# Patient Record
Sex: FEMALE | Employment: UNEMPLOYED | URBAN - NONMETROPOLITAN AREA
[De-identification: names, ages, dates, MRNs, and addresses within clinical notes are randomized per-mention and may not be internally consistent; named-entity substitution may affect disease eponyms.]

---

## 2024-05-20 ENCOUNTER — HOSPITAL ENCOUNTER (EMERGENCY)
Age: 34
Discharge: HOME | End: 2024-05-20
Payer: OTHER GOVERNMENT

## 2024-05-20 VITALS
BODY MASS INDEX: 24.34 KG/M2 | WEIGHT: 170 LBS | HEART RATE: 80 BPM | TEMPERATURE: 98.5 F | DIASTOLIC BLOOD PRESSURE: 92 MMHG | OXYGEN SATURATION: 100 % | HEIGHT: 70 IN | RESPIRATION RATE: 18 BRPM | SYSTOLIC BLOOD PRESSURE: 142 MMHG

## 2024-05-20 DIAGNOSIS — J01.10 ACUTE NON-RECURRENT FRONTAL SINUSITIS: Primary | ICD-10-CM

## 2024-05-20 DIAGNOSIS — R05.1 ACUTE COUGH: ICD-10-CM

## 2024-05-20 PROCEDURE — 99203 OFFICE O/P NEW LOW 30 MIN: CPT

## 2024-05-20 PROCEDURE — 99203 OFFICE O/P NEW LOW 30 MIN: CPT | Performed by: NURSE PRACTITIONER

## 2024-05-20 RX ORDER — DOXYCYCLINE HYCLATE 100 MG
100 TABLET ORAL 2 TIMES DAILY
Qty: 20 TABLET | Refills: 0 | Status: SHIPPED | OUTPATIENT
Start: 2024-05-20 | End: 2024-05-30

## 2024-05-20 RX ORDER — PREDNISONE 20 MG/1
20 TABLET ORAL 2 TIMES DAILY
Qty: 10 TABLET | Refills: 0 | Status: SHIPPED | OUTPATIENT
Start: 2024-05-20 | End: 2024-05-25

## 2024-05-20 ASSESSMENT — ENCOUNTER SYMPTOMS
SINUS PAIN: 1
COUGH: 1
SHORTNESS OF BREATH: 0
RHINORRHEA: 0
SINUS PRESSURE: 1
CHEST TIGHTNESS: 1

## 2024-05-20 ASSESSMENT — PAIN DESCRIPTION - LOCATION: LOCATION: HEAD

## 2024-05-20 ASSESSMENT — PAIN - FUNCTIONAL ASSESSMENT: PAIN_FUNCTIONAL_ASSESSMENT: 0-10

## 2024-05-20 ASSESSMENT — PAIN DESCRIPTION - ORIENTATION: ORIENTATION: LEFT

## 2024-05-20 ASSESSMENT — PAIN SCALES - GENERAL: PAINLEVEL_OUTOF10: 6

## 2024-05-20 NOTE — DISCHARGE INSTR - COC
Continuity of Care Form    Patient Name: Geovanna Eugene   :  1990  MRN:  [id number]    Admit date:  2024  Discharge date:  ***    Code Status Order: No Order   Advance Directives:     Admitting Physician:  No admitting provider for patient encounter.  PCP: No primary care provider on file.    Discharging Nurse: ***  Discharging Hospital Unit/Room#:   Discharging Unit Phone Number: ***    Emergency Contact:   Extended Emergency Contact Information  Primary Emergency Contact: EDILIA EUGENE  Home Phone: [phone]  Relation: Spouse  Preferred language: English    Past Surgical History:  History reviewed. No pertinent surgical history.    Immunization History:     There is no immunization history on file for this patient.    Active Problems:  There is no problem list on file for this patient.      Isolation/Infection:   Isolation            No Isolation          Patient Infection Status       None to display            Nurse Assessment:  Last Vital Signs: BP (!) 142/92   Pulse 80   Temp 98.5 °F (36.9 °C) (Oral)   Resp 18   Ht 1.778 m (5' 10\")   Wt 77.1 kg (170 lb)   SpO2 100%   BMI 24.39 kg/m²     Last documented pain score (0-10 scale): Pain Level: 6  Last Weight:   Wt Readings from Last 1 Encounters:   24 77.1 kg (170 lb)     Mental Status:  {IP PT MENTAL STATUS:}    IV Access:  { MICHELLE IV ACCESS:509500969}    Nursing Mobility/ADLs:  Walking   {CHP DME ADLs:670957953}  Transfer  {CHP DME ADLs:609826232}  Bathing  {CHP DME ADLs:573759381}  Dressing  {CHP DME ADLs:197991101}  Toileting  {CHP DME ADLs:497084000}  Feeding  {CHP DME ADLs:199439436}  Med Admin  {CHP DME ADLs:565749697}  Med Delivery   { MICHELLE MED Delivery:534848977}    Wound Care Documentation and Therapy:        Elimination:  Continence:   Bowel: {YES / NO:}  Bladder: {YES / NO:}  Urinary Catheter: {Urinary Catheter:888104990}   Colostomy/Ileostomy/Ileal Conduit: {YES / NO:}       Date of Last BM: ***  No  intake or output data in the 24 hours ending 24 1116  No intake/output data recorded.    Safety Concerns:     { MICHELLE Safety Concerns:053467599}    Impairments/Disabilities:      { MICHELLE Impairments/Disabilities:088320092}    Nutrition Therapy:  Current Nutrition Therapy:   { MICHELLE Diet List:924175045}    Routes of Feeding: {CH DME Other Feedings:342432286}  Liquids: {Slp liquid thickness:37819}  Daily Fluid Restriction: {CHP DME Yes amt example:663206489}  Last Modified Barium Swallow with Video (Video Swallowing Test): {Done Not Done Date:}    Treatments at the Time of Hospital Discharge:   Respiratory Treatments: ***  Oxygen Therapy:  {Therapy; copd oxygen:19221}  Ventilator:    { CC Vent List:823309960}    Rehab Therapies: {THERAPEUTIC INTERVENTION:5347956195}  Weight Bearing Status/Restrictions: {[facility] Weight Bearin}  Other Medical Equipment (for information only, NOT a DME order):  {EQUIPMENT:769934401}  Other Treatments: ***    Patient's personal belongings (please select all that are sent with patient):  {[facility] DME Belongings:337225930}    RN SIGNATURE:  {Esignature:722876918}    CASE MANAGEMENT/SOCIAL WORK SECTION    Inpatient Status Date: ***    Readmission Risk Assessment Score:  Readmission Risk              Risk of Unplanned Readmission:  0           Discharging to Facility/ Agency   Name:   Address:  Phone:  Fax:    Dialysis Facility (if applicable)   Name:  Address:  Dialysis Schedule:  Phone:  Fax:    / signature: {Esignature:834104575}    PHYSICIAN SECTION    Prognosis: {Prognosis:2847768907}    Condition at Discharge: { Patient Condition:822952191}    Rehab Potential (if transferring to Rehab): {Prognosis:4783556645}    Recommended Labs or Other Treatments After Discharge: ***    Physician Certification: I certify the above information and transfer of Geovanna Márquez  is necessary for the continuing treatment of the diagnosis listed and that she

## 2024-05-20 NOTE — ED PROVIDER NOTES
[facility]  Urgent Care Encounter       CHIEF COMPLAINT       Chief Complaint   Patient presents with    Headache    Sinusitis    Cough       Nurses Notes reviewed and I agree except as noted in the HPI.  HISTORY OF PRESENT ILLNESS   Geovanna Márquez is a 34 y.o. female who presents with reports of a sinus infection that she was treated for greater than 2 days ago.  She was started on Augmentin but since stopped due to tongue swelling.  She denies any trouble breathing.  However, admits to chest tightness and difficulty swallowing at times.  She denies any fever.  Admits to facial pressure and discomfort especially when bending over to her left upper eyebrow.  Admits to a harsh cough as well.  She has tried over-the-counter cough medications without any relief.  Patient is here staying with her family but will be leaving for [place] in the morning.    The history is provided by the patient.       REVIEW OF SYSTEMS     Review of Systems   Constitutional:  Negative for fatigue and fever.   HENT:  Positive for congestion, sinus pressure and sinus pain. Negative for rhinorrhea.    Respiratory:  Positive for cough and chest tightness. Negative for shortness of breath.    Cardiovascular:  Negative for chest pain.   Musculoskeletal:  Negative for myalgias.   Neurological:  Positive for headaches.       PAST MEDICAL HISTORY   History reviewed. No pertinent past medical history.    SURGICALHISTORY     Patient  has no past surgical history on file.    CURRENT MEDICATIONS       Previous Medications    No medications on file       ALLERGIES     Patient is has No Known Allergies.    Patients   There is no immunization history on file for this patient.    FAMILY HISTORY     Patient's family history is not on file.    SOCIAL HISTORY     Patient  reports that she has never smoked. She has never used smokeless tobacco. She reports that she does not currently use alcohol.    PHYSICAL EXAM     ED TRIAGE VITALS  BP: (!)

## 2024-05-20 NOTE — ED NOTES
Pt presents to the urgent care with complaints of sinus infections, HA, and chest pain. Pt reports approx 5 days ago pt was told she had a sinus infection and was prescribed amoxicillin but has stopped taking due to her tongue beginning to swell. Pt reports she does have increased clear mucus coming from nose and coughing up greenish mucus. Pt respirations are even and unlabored. Pt has congested cough. Pt states she does have chest pain when swallowing.      Angel Ponce RN  05/20/24 3908